# Patient Record
Sex: MALE | Race: WHITE
[De-identification: names, ages, dates, MRNs, and addresses within clinical notes are randomized per-mention and may not be internally consistent; named-entity substitution may affect disease eponyms.]

---

## 2019-06-25 ENCOUNTER — HOSPITAL ENCOUNTER (OUTPATIENT)
Dept: HOSPITAL 19 - COL.RAD | Age: 49
End: 2019-06-25
Attending: FAMILY MEDICINE
Payer: COMMERCIAL

## 2019-06-25 DIAGNOSIS — N43.3: Primary | ICD-10-CM

## 2022-02-16 ENCOUNTER — HOSPITAL ENCOUNTER (OUTPATIENT)
Dept: HOSPITAL 19 - COL.RAD | Age: 52
End: 2022-02-16
Attending: UROLOGY
Payer: COMMERCIAL

## 2022-02-16 DIAGNOSIS — N50.812: Primary | ICD-10-CM

## 2022-06-20 ENCOUNTER — HOSPITAL ENCOUNTER (OUTPATIENT)
Dept: HOSPITAL 19 - SDCO | Age: 52
Discharge: HOME | End: 2022-06-20
Attending: SURGERY
Payer: COMMERCIAL

## 2022-06-20 VITALS — SYSTOLIC BLOOD PRESSURE: 121 MMHG | DIASTOLIC BLOOD PRESSURE: 79 MMHG | TEMPERATURE: 97.3 F | HEART RATE: 53 BPM

## 2022-06-20 VITALS — SYSTOLIC BLOOD PRESSURE: 127 MMHG | DIASTOLIC BLOOD PRESSURE: 83 MMHG | HEART RATE: 53 BPM

## 2022-06-20 VITALS — HEART RATE: 53 BPM | DIASTOLIC BLOOD PRESSURE: 98 MMHG | SYSTOLIC BLOOD PRESSURE: 139 MMHG

## 2022-06-20 VITALS — HEART RATE: 57 BPM | SYSTOLIC BLOOD PRESSURE: 135 MMHG | TEMPERATURE: 97.1 F | DIASTOLIC BLOOD PRESSURE: 88 MMHG

## 2022-06-20 VITALS — BODY MASS INDEX: 27.4 KG/M2 | HEIGHT: 70 IN | WEIGHT: 191.36 LBS

## 2022-06-20 DIAGNOSIS — K21.9: Primary | ICD-10-CM

## 2022-06-20 DIAGNOSIS — K29.30: ICD-10-CM

## 2022-06-20 DIAGNOSIS — R63.4: ICD-10-CM

## 2022-06-20 DIAGNOSIS — K44.9: ICD-10-CM

## 2022-06-20 DIAGNOSIS — Z90.49: ICD-10-CM

## 2022-06-20 DIAGNOSIS — K31.89: ICD-10-CM

## 2022-06-20 NOTE — NUR
PATIENT IS DOING WELL AND IS READY FOR DISCHARGE. IV REMOVED AT 0900. DOCTOR
CAME IN AND SPOKE WITH PATIENT. DISCHARGE INSTRUCTIONS REVIEWED. PATIENT'S
RIDE IS DOWNSTAIRS, WILL D/C VIA WHEELCHAIR WHEN DRESSED.

## 2022-06-20 NOTE — NUR
PATIENT RETURNS TO ROOM 1 VIA CART. ASSIST TO CHAIR X 1. PATIENT REQUESTS
WATER TO DRINK AND DOESN'T WANT ANYTHING TO EAT AT THIS TIME. DENIES NAUSEA OR
PAIN. VITAL SIGNS WNL. WILL CONTINUE TO MONITOR.

## 2022-06-20 NOTE — NUR
PATIENT IS ALERT AND ORIENTED. TOLERATED WATER WELL, DENIES ANY PAIN OR
NAUSEA. WAITING FOR DOCTOR TO SEE HIM. HE HAS A RIDE THAT WILL COME PICK HIM
UP ONCE HE IS DISCHARGED. VITAL SIGNS WNL. WILL CONTINUE TO MONITOR.

## 2022-06-28 ENCOUNTER — HOSPITAL ENCOUNTER (OUTPATIENT)
Dept: HOSPITAL 19 - COL.RAD | Age: 52
End: 2022-06-28
Attending: SURGERY
Payer: COMMERCIAL

## 2022-06-28 DIAGNOSIS — K21.9: Primary | ICD-10-CM

## 2022-06-28 PROCEDURE — A9541 TC99M SULFUR COLLOID: HCPCS
